# Patient Record
Sex: MALE | Race: BLACK OR AFRICAN AMERICAN | Employment: FULL TIME | URBAN - METROPOLITAN AREA
[De-identification: names, ages, dates, MRNs, and addresses within clinical notes are randomized per-mention and may not be internally consistent; named-entity substitution may affect disease eponyms.]

---

## 2022-04-15 ENCOUNTER — OFFICE VISIT (OUTPATIENT)
Dept: DERMATOLOGY | Age: 37
End: 2022-04-15
Payer: COMMERCIAL

## 2022-04-15 VITALS
BODY MASS INDEX: 36.3 KG/M2 | SYSTOLIC BLOOD PRESSURE: 166 MMHG | OXYGEN SATURATION: 98 % | WEIGHT: 268 LBS | HEIGHT: 72 IN | DIASTOLIC BLOOD PRESSURE: 104 MMHG | HEART RATE: 91 BPM | TEMPERATURE: 97.7 F

## 2022-04-15 DIAGNOSIS — L73.0 ACNE KELOIDALIS NUCHAE: Primary | ICD-10-CM

## 2022-04-15 PROCEDURE — 99204 OFFICE O/P NEW MOD 45 MIN: CPT | Performed by: PHYSICIAN ASSISTANT

## 2022-04-15 PROCEDURE — 11900 INJECT SKIN LESIONS </W 7: CPT | Performed by: PHYSICIAN ASSISTANT

## 2022-04-15 RX ORDER — CLINDAMYCIN PHOSPHATE 10 UG/ML
LOTION TOPICAL
Qty: 60 ML | Refills: 3 | Status: SHIPPED | OUTPATIENT
Start: 2022-04-15

## 2022-04-15 RX ORDER — PSEUDOEPHEDRINE HCL 30 MG
100 TABLET ORAL 2 TIMES DAILY
COMMUNITY
Start: 2022-02-28 | End: 2022-04-15

## 2022-04-15 RX ORDER — CLOBETASOL PROPIONATE 0.46 MG/ML
SOLUTION TOPICAL
Qty: 50 ML | Refills: 1 | Status: SHIPPED | OUTPATIENT
Start: 2022-04-15

## 2022-04-15 RX ORDER — DOXYCYCLINE HYCLATE 100 MG/1
CAPSULE ORAL
Qty: 120 CAPSULE | Refills: 0 | Status: SHIPPED | OUTPATIENT
Start: 2022-04-15

## 2022-04-15 NOTE — PROGRESS NOTES
Dermatology Patient Note  Francia  [address]  [address] [facility] [address]  Dept: [phone]  Dept Fax: [phone]      VISITDATE: 4/15/2022   REFERRING PROVIDER: No ref. provider found      Antwan Burden is a 30 y.o. male  who presents today in the office for:    New Patient (no hx of skincancer ), Lesion(s) (neck- pt has had it for several months, noticed it after a haircut. its itchy and read and spreading.  ), and Other (pt has used: antifungal OTC, hydrocortisone otc)      HISTORY OF PRESENT ILLNESS:  As above. Began getting\"bumps\" on posterior scalp line after a hair cut. MEDICAL PROBLEMS:  There are no problems to display for this patient. CURRENT MEDICATIONS:   Current Outpatient Medications   Medication Sig Dispense Refill    benzoyl peroxide 5 % external liquid Wash affected areas once daily 227 g 3    clindamycin (CLEOCIN T) 1 % lotion Apply to affected areas daily 60 mL 3    doxycycline hyclate (VIBRAMYCIN) 100 MG capsule Take 1 pill twice daily with food 120 capsule 0    clobetasol (TEMOVATE) 0.05 % external solution Apply topically 2 times daily. 50 mL 1     No current facility-administered medications for this visit. ALLERGIES:   No Known Allergies    SOCIAL HISTORY:  Social History     Tobacco Use    Smoking status: Never Smoker    Smokeless tobacco: Never Used   Substance Use Topics    Alcohol use: Not Currently       Pertinent ROS:  Review of Systems  Skin: Denies any new changing, growing or bleeding lesions or rashes except as described in the HPI   Constitutional: Denies fevers, chills, and malaise.     PHYSICAL EXAM:   BP (!) 166/104   Pulse 91   Temp 97.7 °F (36.5 °C) (Temporal)   Ht 6' (1.829 m)   Wt 268 lb (121.6 kg)   SpO2 98%   BMI 36.35 kg/m²     The patient is generally well appearing, well nourished, alert and conversational. Affect is normal.    Cutaneous Exam:  Physical Exam  Face and neck only was examined. Facial covering was not removed during examination. Diagnoses/exam findings/medical history pertinent to this visit are listed below:    Assessment:   Diagnosis Orders   1. Acne keloidalis nuchae  benzoyl peroxide 5 % external liquid    clindamycin (CLEOCIN T) 1 % lotion    doxycycline hyclate (VIBRAMYCIN) 100 MG capsule    clobetasol (TEMOVATE) 0.05 % external solution   - chronic illness with progression and/or exacerbation     Plan:  1. Acne keloidalis nuchae  Intralesional Injection: After discussion of risks including atrophy and dyspigmentation, patient gives verbal consent to proceed. After cleansing with alcohol the keloid on the posterior scalp were injected with 0.8 ml of Kenalog 10 mg/mL  - benzoyl peroxide 5 % external liquid; Wash affected areas once daily  Dispense: 227 g; Refill: 3  - clindamycin (CLEOCIN T) 1 % lotion; Apply to affected areas daily  Dispense: 60 mL; Refill: 3  - doxycycline hyclate (VIBRAMYCIN) 100 MG capsule; Take 1 pill twice daily with food  Dispense: 120 capsule; Refill: 0  Patient counseled regarding side effects of doxycycline, including photosensitivity, gastrointestinal upset, increased intracranial pressure, chemical esophagitis, teratogenicity,  increased intracranial pressure, and severe drug reaction. Patient also counseled to take doxycycline with food and a full glass of water. - clobetasol (TEMOVATE) 0.05 % external solution; Apply topically 2 times daily. Dispense: 50 mL; Refill: 1      RTC 3M    Future Appointments   Date Time Provider Silverio Cervantes   7/15/2022  1:00 PM Satish Restrepo PA-C  [facility]         There are no Patient Instructions on file for this visit.       Electronically signed by Satish Restrepo PA-C on 4/15/22 at 1:09 PM EDT